# Patient Record
Sex: FEMALE | URBAN - METROPOLITAN AREA
[De-identification: names, ages, dates, MRNs, and addresses within clinical notes are randomized per-mention and may not be internally consistent; named-entity substitution may affect disease eponyms.]

---

## 2022-12-27 ENCOUNTER — TELEPHONE (OUTPATIENT)
Dept: GASTROENTEROLOGY | Facility: CLINIC | Age: 62
End: 2022-12-27

## 2022-12-27 NOTE — TELEPHONE ENCOUNTER
Patient called stating that she is scheduled for a procedure with us sometime in February  I was unable to see this procedure to verify if it is scheduled  Patient was unable to provide additional information as to who she was scheduled with  Patient requested a call back to confirm her upcoming procedure with us  Please let patient know where and what day she is scheduled  Thank you

## 2022-12-29 NOTE — TELEPHONE ENCOUNTER
Spoke to patient  It was discovered that she had 2 charts (now marked for merge)  Her procedure is scheduled 02/07/2023 with Dr Irineo Toney

## 2022-12-29 NOTE — TELEPHONE ENCOUNTER
Spoke to patient and she said the head coordinator of scheduling, at the call center, scheduled her for [facility] on 02/07 with Dr Clayton Morales but Dr Clayton Morales is not on that day Dr Velma June is and no record of procedure  Spoke to Olvin Garza and she's looking into it and will call patient back later